# Patient Record
Sex: FEMALE | Race: BLACK OR AFRICAN AMERICAN | NOT HISPANIC OR LATINO | URBAN - METROPOLITAN AREA
[De-identification: names, ages, dates, MRNs, and addresses within clinical notes are randomized per-mention and may not be internally consistent; named-entity substitution may affect disease eponyms.]

---

## 2025-07-19 ENCOUNTER — OFFICE VISIT (OUTPATIENT)
Dept: URGENT CARE | Age: 89
End: 2025-07-19
Payer: MEDICARE

## 2025-07-19 ENCOUNTER — HOSPITAL ENCOUNTER (OUTPATIENT)
Dept: RADIOLOGY | Facility: CLINIC | Age: 89
Discharge: HOME | End: 2025-07-19
Payer: MEDICARE

## 2025-07-19 VITALS
TEMPERATURE: 99 F | OXYGEN SATURATION: 93 % | RESPIRATION RATE: 18 BRPM | DIASTOLIC BLOOD PRESSURE: 79 MMHG | SYSTOLIC BLOOD PRESSURE: 137 MMHG | HEART RATE: 102 BPM

## 2025-07-19 DIAGNOSIS — R53.83 OTHER FATIGUE: ICD-10-CM

## 2025-07-19 DIAGNOSIS — R05.9 COUGH, UNSPECIFIED TYPE: ICD-10-CM

## 2025-07-19 DIAGNOSIS — R53.83 OTHER FATIGUE: Primary | ICD-10-CM

## 2025-07-19 DIAGNOSIS — R06.00 DYSPNEA, UNSPECIFIED TYPE: ICD-10-CM

## 2025-07-19 LAB
POC CORONAVIRUS SARS-COV-2 PCR: NEGATIVE
POC HUMAN RHINOVIRUS PCR: NEGATIVE
POC INFLUENZA A VIRUS PCR: NEGATIVE
POC INFLUENZA B VIRUS PCR: NEGATIVE
POC RESPIRATORY SYNCYTIAL VIRUS PCR: NEGATIVE

## 2025-07-19 PROCEDURE — 87631 RESP VIRUS 3-5 TARGETS: CPT | Performed by: FAMILY MEDICINE

## 2025-07-19 PROCEDURE — 1036F TOBACCO NON-USER: CPT | Performed by: FAMILY MEDICINE

## 2025-07-19 PROCEDURE — 1159F MED LIST DOCD IN RCRD: CPT | Performed by: FAMILY MEDICINE

## 2025-07-19 PROCEDURE — 71046 X-RAY EXAM CHEST 2 VIEWS: CPT

## 2025-07-19 PROCEDURE — 99204 OFFICE O/P NEW MOD 45 MIN: CPT | Performed by: FAMILY MEDICINE

## 2025-07-19 PROCEDURE — 1160F RVW MEDS BY RX/DR IN RCRD: CPT | Performed by: FAMILY MEDICINE

## 2025-07-19 RX ORDER — PREDNISONE 20 MG/1
20 TABLET ORAL DAILY
Qty: 5 TABLET | Refills: 0 | Status: SHIPPED | OUTPATIENT
Start: 2025-07-19 | End: 2025-07-24

## 2025-07-19 RX ORDER — DOXYCYCLINE 100 MG/1
100 CAPSULE ORAL 2 TIMES DAILY
Qty: 14 CAPSULE | Refills: 0 | Status: SHIPPED | OUTPATIENT
Start: 2025-07-19 | End: 2025-07-26

## 2025-07-19 RX ORDER — AMLODIPINE BESYLATE 5 MG/1
TABLET ORAL
COMMUNITY
Start: 2018-07-12

## 2025-07-19 RX ORDER — BENZONATATE 100 MG/1
100 CAPSULE ORAL 3 TIMES DAILY PRN
Qty: 30 CAPSULE | Refills: 0 | Status: SHIPPED | OUTPATIENT
Start: 2025-07-19 | End: 2025-07-29

## 2025-07-19 RX ORDER — FLUTICASONE PROPIONATE 50 MCG
SPRAY, SUSPENSION (ML) NASAL
COMMUNITY
Start: 2018-12-17

## 2025-07-19 RX ORDER — TOLTERODINE TARTRATE 2 MG/1
TABLET, EXTENDED RELEASE ORAL
COMMUNITY
Start: 2025-07-08

## 2025-07-19 NOTE — PROGRESS NOTES
HPI:  Patient is here with her .  Pt states that she had a cough for about a month, but over the past 4 days cough has gotten worse.  Pt states that she had increase in sputum production and fatigue.  Some SOB, but no CP.  Per  pt had pneumonia a year ago, but was feeling worse at that time.  Pt denies fever/chills.  No body aches.  No n/v/diarrhea.  Pt has been eating and drinking, but less than usual.  No urinary symptoms.  No ST.  Pt does not want to go to the hospital again and would like to be treated on outpatient bases.         ROS:  +cough  +dyspnea  No CP  +fatigue  No n/v/diarrhea    PE:    A&O x3, pt looks tired, but not in distress  NCAT  No conjunctival erythema  No pharyngeal erythema or swelling, MMM  Sinus tachy  Coarse breath sounds at bases   MOEx4  No focal deficit  Judgement normal    Results:  CXR: 1.  Elevated right hemidiaphragm  2.  Diffuse interstitial thickening throughout both lungs most  pronounced within the left lower thorax.  3.  No pleural effusion. Heart is normal size.  There is no pulmonary vascular congestion.  [place]: Covid/flu/rsv/rhino negative    A/P:   Cough  Fatigue  Dyspnea     Rest.  Vaporub.  Cool mist humidifier. Eat yogurt and take probiotics when on medication.  Monitor blood pressure and blood sugar when on steroids.  Tylenol as needed for pain.  Keep a diary of symptoms.  If no improvement by tomorrow with medication or symptoms start getting worse go to the ER.  If symptoms are improving recheck with your doctor in 3 days.